# Patient Record
Sex: MALE | Race: WHITE
[De-identification: names, ages, dates, MRNs, and addresses within clinical notes are randomized per-mention and may not be internally consistent; named-entity substitution may affect disease eponyms.]

---

## 2019-03-25 ENCOUNTER — HOSPITAL ENCOUNTER (EMERGENCY)
Dept: HOSPITAL 92 - ERS | Age: 59
LOS: 1 days | Discharge: HOME | End: 2019-03-26
Payer: COMMERCIAL

## 2019-03-25 DIAGNOSIS — R42: Primary | ICD-10-CM

## 2019-03-25 DIAGNOSIS — I10: ICD-10-CM

## 2019-03-25 DIAGNOSIS — M10.9: ICD-10-CM

## 2019-03-25 DIAGNOSIS — Z79.899: ICD-10-CM

## 2019-03-25 LAB
ALBUMIN SERPL BCG-MCNC: 4.7 G/DL (ref 3.5–5)
ALP SERPL-CCNC: 89 U/L (ref 40–150)
ALT SERPL W P-5'-P-CCNC: 52 U/L (ref 8–55)
ANION GAP SERPL CALC-SCNC: 12 MMOL/L (ref 10–20)
APTT PPP: 31.3 SEC (ref 22.9–36.1)
AST SERPL-CCNC: 26 U/L (ref 5–34)
BASOPHILS # BLD AUTO: 0.1 THOU/UL (ref 0–0.2)
BASOPHILS NFR BLD AUTO: 0.7 % (ref 0–1)
BILIRUB SERPL-MCNC: 0.9 MG/DL (ref 0.2–1.2)
BUN SERPL-MCNC: 11 MG/DL (ref 8.4–25.7)
CALCIUM SERPL-MCNC: 10.5 MG/DL (ref 7.8–10.44)
CHLORIDE SERPL-SCNC: 101 MMOL/L (ref 98–107)
CK SERPL-CCNC: 61 U/L (ref 30–200)
CO2 SERPL-SCNC: 28 MMOL/L (ref 22–29)
CREAT CL PREDICTED SERPL C-G-VRATE: 0 ML/MIN (ref 70–130)
EOSINOPHIL # BLD AUTO: 0 THOU/UL (ref 0–0.7)
EOSINOPHIL NFR BLD AUTO: 0.4 % (ref 0–10)
GLOBULIN SER CALC-MCNC: 3.5 G/DL (ref 2.4–3.5)
GLUCOSE SERPL-MCNC: 125 MG/DL (ref 70–105)
HGB BLD-MCNC: 16.8 G/DL (ref 14–18)
INR PPP: 1
LYMPHOCYTES # BLD: 1.4 THOU/UL (ref 1.2–3.4)
LYMPHOCYTES NFR BLD AUTO: 14.4 % (ref 21–51)
MCH RBC QN AUTO: 28.7 PG (ref 27–31)
MCV RBC AUTO: 86.1 FL (ref 78–98)
MONOCYTES # BLD AUTO: 0.4 THOU/UL (ref 0.11–0.59)
MONOCYTES NFR BLD AUTO: 3.9 % (ref 0–10)
NEUTROPHILS # BLD AUTO: 8.1 THOU/UL (ref 1.4–6.5)
NEUTROPHILS NFR BLD AUTO: 80.6 % (ref 42–75)
PLATELET # BLD AUTO: 202 THOU/UL (ref 130–400)
POTASSIUM SERPL-SCNC: 4.3 MMOL/L (ref 3.5–5.1)
PROTHROMBIN TIME: 13.3 SEC (ref 12–14.7)
RBC # BLD AUTO: 5.87 MILL/UL (ref 4.7–6.1)
SODIUM SERPL-SCNC: 137 MMOL/L (ref 136–145)
WBC # BLD AUTO: 10 THOU/UL (ref 4.8–10.8)

## 2019-03-25 PROCEDURE — 85730 THROMBOPLASTIN TIME PARTIAL: CPT

## 2019-03-25 PROCEDURE — 80053 COMPREHEN METABOLIC PANEL: CPT

## 2019-03-25 PROCEDURE — 70496 CT ANGIOGRAPHY HEAD: CPT

## 2019-03-25 PROCEDURE — 93005 ELECTROCARDIOGRAM TRACING: CPT

## 2019-03-25 PROCEDURE — 70498 CT ANGIOGRAPHY NECK: CPT

## 2019-03-25 PROCEDURE — 85610 PROTHROMBIN TIME: CPT

## 2019-03-25 PROCEDURE — 85025 COMPLETE CBC W/AUTO DIFF WBC: CPT

## 2019-03-25 PROCEDURE — 84484 ASSAY OF TROPONIN QUANT: CPT

## 2019-03-25 PROCEDURE — 70450 CT HEAD/BRAIN W/O DYE: CPT

## 2019-03-25 PROCEDURE — 36416 COLLJ CAPILLARY BLOOD SPEC: CPT

## 2019-03-25 PROCEDURE — 96374 THER/PROPH/DIAG INJ IV PUSH: CPT

## 2019-03-25 PROCEDURE — 82550 ASSAY OF CK (CPK): CPT

## 2019-03-25 NOTE — CT
CT HEAD NONCONTRAST:

 

History: Dizziness. 

 

Comparison: 11-3-11

 

FINDINGS: 

There is no evidence of acute intracranial hemorrhage or infarct. Mild chronic ischemic small vessel 
disease is stable. There is no mass effect or shift of midline structures. Visualized paranasal sinus
es remain well aerated. 

 

IMPRESSION: 

No acute intracranial abnormalities are demonstrated. 

 

 

 

 

 

 

Findings were called to Dr. Bowen in the Emergency Department at 2140 hours. 

Code CR

 

POS: [facility]

## 2019-03-25 NOTE — CT
CT ARTERIOGRAM NECK WITH IV CONTRAST AND 3D MIP IMAGING 

CT ARTERIOGRAM HEAD WITH IV CONTRAST AND 3D MIP IMAGING

CT BRAIN WITH IV CONTRAST:

 

History: Dizziness. Altered mental status. 

 

FINDINGS: 

There is normal branching of the great vessels at the aortic arch. Good flow into each internal carot
id and vertebral system. Minimal calcification at each carotid bifurcation. No significant stenosis. 


 

[place] is intact. Good flow into each cerebral and cerebellar system. No filling defects ar
e apparent. No abnormal areas of intracranial contrast enhancement visible. 

 

IMPRESSION: 

No acute vascular abnormalities are demonstrated. Minimal atherosclerosis. 

 

 

 

 

Findings were called to Dr. Bowen in the Emergency Department at 2156 hours. 

 

Code CR

 

POS: [facility]

## 2019-03-27 NOTE — CT
CT ARTERIOGRAM NECK WITH IV CONTRAST AND 3D MIP IMAGING 

CT ARTERIOGRAM HEAD WITH IV CONTRAST AND 3D MIP IMAGING

CT BRAIN WITH IV CONTRAST:

 

History: Dizziness. Altered mental status. 

 

FINDINGS: 

There is normal branching of the great vessels at the aortic arch. Good flow into each internal carot
id and vertebral system. Minimal calcification at each carotid bifurcation. No significant stenosis. 


 

[place] is intact. Good flow into each cerebral and cerebellar system. No filling defects ar
e apparent. No abnormal areas of intracranial contrast enhancement visible. 

 

IMPRESSION: 

No acute vascular abnormalities are demonstrated. Minimal atherosclerosis. 

 

 

 

 

Findings were called to Dr. Bowen in the Emergency Department at 2156 hours. 

 

Code CR